# Patient Record
Sex: FEMALE | Race: WHITE | Employment: OTHER | URBAN - METROPOLITAN AREA
[De-identification: names, ages, dates, MRNs, and addresses within clinical notes are randomized per-mention and may not be internally consistent; named-entity substitution may affect disease eponyms.]

---

## 2017-04-11 ENCOUNTER — OFFICE VISIT (OUTPATIENT)
Dept: GASTROENTEROLOGY | Age: 67
End: 2017-04-11

## 2017-04-11 VITALS
HEART RATE: 89 BPM | DIASTOLIC BLOOD PRESSURE: 76 MMHG | BODY MASS INDEX: 25.79 KG/M2 | SYSTOLIC BLOOD PRESSURE: 140 MMHG | WEIGHT: 155 LBS

## 2017-04-11 DIAGNOSIS — Z79.899 ENCOUNTER FOR LONG-TERM (CURRENT) USE OF MEDICATIONS: ICD-10-CM

## 2017-04-11 DIAGNOSIS — Z80.0 FAMILY HISTORY OF COLON CANCER: Primary | ICD-10-CM

## 2017-04-11 PROCEDURE — 1123F ACP DISCUSS/DSCN MKR DOCD: CPT | Performed by: INTERNAL MEDICINE

## 2017-04-11 PROCEDURE — 1090F PRES/ABSN URINE INCON ASSESS: CPT | Performed by: INTERNAL MEDICINE

## 2017-04-11 PROCEDURE — 3017F COLORECTAL CA SCREEN DOC REV: CPT | Performed by: INTERNAL MEDICINE

## 2017-04-11 PROCEDURE — 1036F TOBACCO NON-USER: CPT | Performed by: INTERNAL MEDICINE

## 2017-04-11 PROCEDURE — G8399 PT W/DXA RESULTS DOCUMENT: HCPCS | Performed by: INTERNAL MEDICINE

## 2017-04-11 PROCEDURE — 99203 OFFICE O/P NEW LOW 30 MIN: CPT | Performed by: INTERNAL MEDICINE

## 2017-04-11 PROCEDURE — 4040F PNEUMOC VAC/ADMIN/RCVD: CPT | Performed by: INTERNAL MEDICINE

## 2017-04-11 PROCEDURE — G8420 CALC BMI NORM PARAMETERS: HCPCS | Performed by: INTERNAL MEDICINE

## 2017-04-11 PROCEDURE — 3014F SCREEN MAMMO DOC REV: CPT | Performed by: INTERNAL MEDICINE

## 2017-04-11 PROCEDURE — G8427 DOCREV CUR MEDS BY ELIG CLIN: HCPCS | Performed by: INTERNAL MEDICINE

## 2017-06-07 ENCOUNTER — HOSPITAL ENCOUNTER (OUTPATIENT)
Age: 67
Setting detail: OUTPATIENT SURGERY
Discharge: HOME | End: 2017-06-07
Attending: INTERNAL MEDICINE | Admitting: INTERNAL MEDICINE
Payer: MEDICARE

## 2017-06-07 ENCOUNTER — ANESTHESIA (OUTPATIENT)
Dept: ENDOSCOPY | Age: 67
End: 2017-06-07
Payer: MEDICARE

## 2017-06-07 ENCOUNTER — ANESTHESIA EVENT (OUTPATIENT)
Dept: ENDOSCOPY | Age: 67
End: 2017-06-07
Payer: MEDICARE

## 2017-06-07 VITALS
WEIGHT: 159 LBS | HEIGHT: 64 IN | BODY MASS INDEX: 27.14 KG/M2 | OXYGEN SATURATION: 98 % | SYSTOLIC BLOOD PRESSURE: 107 MMHG | HEART RATE: 74 BPM | DIASTOLIC BLOOD PRESSURE: 83 MMHG | TEMPERATURE: 97.9 F | RESPIRATION RATE: 18 BRPM

## 2017-06-07 VITALS
DIASTOLIC BLOOD PRESSURE: 53 MMHG | TEMPERATURE: 97.7 F | RESPIRATION RATE: 13 BRPM | SYSTOLIC BLOOD PRESSURE: 91 MMHG | OXYGEN SATURATION: 99 %

## 2017-06-07 PROCEDURE — 3700000001 HC ADD 15 MINUTES (ANESTHESIA): Performed by: INTERNAL MEDICINE

## 2017-06-07 PROCEDURE — 6360000002 HC RX W HCPCS: Performed by: NURSE ANESTHETIST, CERTIFIED REGISTERED

## 2017-06-07 PROCEDURE — 7100000010 HC PHASE II RECOVERY - FIRST 15 MIN: Performed by: INTERNAL MEDICINE

## 2017-06-07 PROCEDURE — 3700000000 HC ANESTHESIA ATTENDED CARE: Performed by: INTERNAL MEDICINE

## 2017-06-07 PROCEDURE — 7100000011 HC PHASE II RECOVERY - ADDTL 15 MIN: Performed by: INTERNAL MEDICINE

## 2017-06-07 PROCEDURE — 2580000003 HC RX 258: Performed by: INTERNAL MEDICINE

## 2017-06-07 PROCEDURE — 2500000003 HC RX 250 WO HCPCS: Performed by: NURSE ANESTHETIST, CERTIFIED REGISTERED

## 2017-06-07 PROCEDURE — 3609027000 HC COLONOSCOPY: Performed by: INTERNAL MEDICINE

## 2017-06-07 RX ORDER — PROPOFOL 10 MG/ML
INJECTION, EMULSION INTRAVENOUS PRN
Status: DISCONTINUED | OUTPATIENT
Start: 2017-06-07 | End: 2017-06-07 | Stop reason: SDUPTHER

## 2017-06-07 RX ORDER — LIDOCAINE HYDROCHLORIDE 10 MG/ML
1 INJECTION, SOLUTION EPIDURAL; INFILTRATION; INTRACAUDAL; PERINEURAL
Status: DISCONTINUED | OUTPATIENT
Start: 2017-06-07 | End: 2017-06-07 | Stop reason: HOSPADM

## 2017-06-07 RX ORDER — LIDOCAINE HYDROCHLORIDE 20 MG/ML
INJECTION, SOLUTION EPIDURAL; INFILTRATION; INTRACAUDAL; PERINEURAL PRN
Status: DISCONTINUED | OUTPATIENT
Start: 2017-06-07 | End: 2017-06-07 | Stop reason: SDUPTHER

## 2017-06-07 RX ORDER — SODIUM CHLORIDE 0.9 % (FLUSH) 0.9 %
10 SYRINGE (ML) INJECTION PRN
Status: DISCONTINUED | OUTPATIENT
Start: 2017-06-07 | End: 2017-06-07 | Stop reason: HOSPADM

## 2017-06-07 RX ORDER — ONDANSETRON 2 MG/ML
4 INJECTION INTRAMUSCULAR; INTRAVENOUS
Status: DISCONTINUED | OUTPATIENT
Start: 2017-06-07 | End: 2017-06-07 | Stop reason: HOSPADM

## 2017-06-07 RX ORDER — SODIUM CHLORIDE 9 MG/ML
INJECTION, SOLUTION INTRAVENOUS CONTINUOUS
Status: DISCONTINUED | OUTPATIENT
Start: 2017-06-07 | End: 2017-06-07 | Stop reason: HOSPADM

## 2017-06-07 RX ORDER — SODIUM CHLORIDE 0.9 % (FLUSH) 0.9 %
10 SYRINGE (ML) INJECTION EVERY 12 HOURS SCHEDULED
Status: DISCONTINUED | OUTPATIENT
Start: 2017-06-07 | End: 2017-06-07 | Stop reason: HOSPADM

## 2017-06-07 RX ADMIN — PROPOFOL 70 MG: 10 INJECTION, EMULSION INTRAVENOUS at 12:15

## 2017-06-07 RX ADMIN — PROPOFOL 100 MG: 10 INJECTION, EMULSION INTRAVENOUS at 12:10

## 2017-06-07 RX ADMIN — SODIUM CHLORIDE: 900 INJECTION, SOLUTION INTRAVENOUS at 12:15

## 2017-06-07 RX ADMIN — LIDOCAINE HYDROCHLORIDE 30 MG: 20 INJECTION, SOLUTION EPIDURAL; INFILTRATION; INTRACAUDAL; PERINEURAL at 12:08

## 2017-06-07 RX ADMIN — SODIUM CHLORIDE: 900 INJECTION, SOLUTION INTRAVENOUS at 11:44

## 2017-06-07 RX ADMIN — PROPOFOL 100 MG: 10 INJECTION, EMULSION INTRAVENOUS at 12:08

## 2023-10-03 ENCOUNTER — OFFICE VISIT (OUTPATIENT)
Dept: BEHAVIORAL HEALTH | Facility: CLINIC | Age: 73
End: 2023-10-03
Payer: MEDICARE

## 2023-10-03 VITALS
TEMPERATURE: 97.7 F | WEIGHT: 160.56 LBS | RESPIRATION RATE: 18 BRPM | SYSTOLIC BLOOD PRESSURE: 146 MMHG | DIASTOLIC BLOOD PRESSURE: 81 MMHG | BODY MASS INDEX: 27.41 KG/M2 | HEIGHT: 64 IN | HEART RATE: 68 BPM

## 2023-10-03 DIAGNOSIS — F41.9 ANXIETY: ICD-10-CM

## 2023-10-03 PROCEDURE — 99213 OFFICE O/P EST LOW 20 MIN: CPT | Performed by: PSYCHIATRY & NEUROLOGY

## 2023-10-03 RX ORDER — ALPRAZOLAM 0.25 MG/1
0.25 TABLET ORAL 4 TIMES DAILY PRN
COMMUNITY
End: 2023-10-03 | Stop reason: SDUPTHER

## 2023-10-03 RX ORDER — SERTRALINE HYDROCHLORIDE 50 MG/1
50 TABLET, FILM COATED ORAL DAILY
Qty: 30 TABLET | Refills: 11 | Status: SHIPPED | OUTPATIENT
Start: 2023-10-03 | End: 2024-10-02

## 2023-10-03 RX ORDER — ALPRAZOLAM 0.25 MG/1
0.25 TABLET ORAL 4 TIMES DAILY PRN
Qty: 120 TABLET | Refills: 5 | Status: SHIPPED | OUTPATIENT
Start: 2023-10-03 | End: 2024-05-14 | Stop reason: SDUPTHER

## 2023-10-03 NOTE — PROGRESS NOTES
Patient Discussion/Summary     Doing well on current psychiatric medications with alprazolam 0.25 mg. 4 times only without difficulty for significant periods of weeks or months. Continue sertraline 50 mg daily and alprazolam at the current dose.  RTC in 5 months earlier as needed..      Chief Complaint     Face - To - Face Visit.   Chronic condition stable at goal.  20'.      History of Present Illness  This note was dictated using dragon speak there may be errors in transcription of the dictation.  20'     Here on a regular scheduled follow-up visit for this 72-year-old white  woman with panic disorder and agoraphobia who was initially seen in the anxiety clinic in April 1999 and successfully treated with sertraline 50 mg daily and Xanax 0.25 mg 4 times a day with maintained remission to the present. An earlier switch to long acting Xanax was unsuccessful and a trial of Wellbutrin for depressed mood when she lost her father more than 10 years ago had to be stopped due to side effects of palpitation and increased anxiety. 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 She has been in good general health in the interim(followed at [facility]) and with essentially unchanged presentation and condition today: alert, oriented, cooperative, pleasant, appropriate, in no acute distress, agitation or confusion, no evidence of psychosis major mood disturbance, suicidal or homicidal ideations with intent or plan. No evidence of pathological grief ,affect full range, well-modulated, future oriented, spending time with her  who is retired and the children and grandchildren as usual. Denied alcohol and substance abuse, in particular benzodiazepines, and OARRS did not reveal any issues of concern. Today we discussed continuing   alprazolam 0.25 mg 4 times a day on a regular basis since it appeared that this is the dose she has taken regularly over several months.   Impression: panic disorder in remission.  Plan: Continue maintenance pharmacotherapy sertraline 50 mg daily and alprazolam 0.25 mg 4 times a day. RTC in 5 months, earlier when necessary..      I have personally reviewed the OARRS report for NANDO REYESCANDIDO. I have considered the risks of abuse, dependence, addiction and diversion.   I have the following concerns: no.

## 2024-04-02 ENCOUNTER — APPOINTMENT (OUTPATIENT)
Dept: BEHAVIORAL HEALTH | Facility: CLINIC | Age: 74
End: 2024-04-02
Payer: MEDICARE

## 2024-05-07 ENCOUNTER — APPOINTMENT (OUTPATIENT)
Dept: BEHAVIORAL HEALTH | Facility: CLINIC | Age: 74
End: 2024-05-07
Payer: MEDICARE

## 2024-05-14 ENCOUNTER — OFFICE VISIT (OUTPATIENT)
Dept: BEHAVIORAL HEALTH | Facility: CLINIC | Age: 74
End: 2024-05-14
Payer: MEDICARE

## 2024-05-14 VITALS
BODY MASS INDEX: 27.88 KG/M2 | WEIGHT: 163.3 LBS | SYSTOLIC BLOOD PRESSURE: 140 MMHG | RESPIRATION RATE: 16 BRPM | HEIGHT: 64 IN | HEART RATE: 73 BPM | DIASTOLIC BLOOD PRESSURE: 74 MMHG | TEMPERATURE: 98.1 F

## 2024-05-14 DIAGNOSIS — F41.9 ANXIETY: ICD-10-CM

## 2024-05-14 PROCEDURE — 99214 OFFICE O/P EST MOD 30 MIN: CPT | Performed by: PSYCHIATRY & NEUROLOGY

## 2024-05-14 RX ORDER — ALPRAZOLAM 0.25 MG/1
0.25 TABLET ORAL 3 TIMES DAILY PRN
Qty: 90 TABLET | Refills: 5 | Status: SHIPPED | OUTPATIENT
Start: 2024-05-14

## 2024-05-14 NOTE — PROGRESS NOTES
Patient Discussion/Summary     Doing well on current psychiatric medications  on sertraline 50 mg daily and alprazolam 0.25 mg 3-4 times daily in addition to gabapentin 600mg for sciatica in recent months.Continue on alprazolam 3 times daily only until RTC in 5 months earlier as needed..      Chief Complaint     Face - To - Face Visit.   Chronic condition stable at goal.  30'.      History of Present Illness  This note was dictated using dragon speak there may be errors in transcription of the dictation.  30'     Here on a regular scheduled follow-up visit for this 73-year-old white  woman with panic disorder and agoraphobia who was initially seen in the anxiety clinic in April 1999 and successfully treated with sertraline 50 mg daily and Xanax 0.25 mg 4 times a day with maintained remission to the present. An earlier switch to long acting Xanax was unsuccessful and a trial of Wellbutrin for depressed mood when she lost her father more than 10 years ago had to be stopped due to side effects of palpitation and increased anxiety. Today she presents with no specific complaints of panic/avoidances, depression since her last visit of 10/3/23 and reports taking Xanax 3-4 times a day without untoward reactions after starting on gabapentin currently on 600mg daily for sciatica in recent months with very good effect. Otherwise she has continued to be  in good general health in the interim(followed at [facility]) and with essentially unchanged presentation and condition today: alert, oriented, cooperative, pleasant, appropriate, in no acute distress, agitation or confusion, no evidence of psychosis major mood disturbance, suicidal or homicidal ideations with intent or plan. No evidence of change in MSE or pathological grief ,affect full range, well-modulated, future oriented, spending time with her  who is retired and the children and grandchildren as usual. Denied alcohol and substance abuse, in particular  benzodiazepines, and OARRS did not reveal any issues of concern. Today we discussed continuing  alprazolam on the slightly decreased dose of 0.25 mg 3 times a day on a regular basis since she no longer has the stress of taking care of her mother's estate or other situational concerns.   Impression: panic disorder in remission.  Plan: Continue maintenance pharmacotherapy sertraline 50 mg daily and alprazolam 0.25 mg 3 times a day. RTC in 5 months, earlier when necessary..      I have personally reviewed the OARRS report for NANDO FLORES. I have considered the risks of abuse, dependence, addiction and diversion.   I have the following concerns: no.

## 2024-06-04 ENCOUNTER — PATIENT MESSAGE (OUTPATIENT)
Dept: BEHAVIORAL HEALTH | Facility: CLINIC | Age: 74
End: 2024-06-04
Payer: MEDICARE

## 2024-10-05 ENCOUNTER — TELEPHONE (OUTPATIENT)
Dept: PSYCHIATRY | Facility: HOSPITAL | Age: 74
End: 2024-10-05
Payer: MEDICARE

## 2024-10-05 NOTE — PROGRESS NOTES
Patient called the answering service; returned page. Patient concerned that she is running out of her sertraline 50 mg that she takes daily. Denies symptomatic/emotional/mental concerns at this time. Denies SI/HI/AVH. Patient informed that if these thoughts ever exist, to go to the emergency department. Patient expressed understanding.    Patient follows with Dr. Romo. Diagnosed with panic disorder and takes sertraline 50 mg daily.    I decided to not refill the patient's medication due to the lack of an emergent withdrawal syndrome. Patient confirms that she took a dose last night and would be covered for today 10/5, and would only not have medication for Driss 10/6. I encouraged the patient to call her psychiatrist ASAP Monday morning to rectify the prescription issue. In the meantime, I encouraged patient to use coping skills and immediately support system. If mental state worsens, or if patient endorses SI/HI/AVH, to go immediately to the emergency department.      10/05/24 at 5:17 PM - Brandan Deal MD  Adult Psychiatry, PGY-1

## 2024-10-07 DIAGNOSIS — F41.9 ANXIETY: ICD-10-CM

## 2024-10-07 RX ORDER — SERTRALINE HYDROCHLORIDE 50 MG/1
50 TABLET, FILM COATED ORAL DAILY
Qty: 30 TABLET | Refills: 11 | Status: SHIPPED | OUTPATIENT
Start: 2024-10-07 | End: 2025-10-07

## 2024-11-12 ENCOUNTER — APPOINTMENT (OUTPATIENT)
Dept: BEHAVIORAL HEALTH | Facility: CLINIC | Age: 74
End: 2024-11-12
Payer: MEDICARE

## 2024-11-12 DIAGNOSIS — F41.9 ANXIETY: ICD-10-CM

## 2024-11-12 PROCEDURE — 99214 OFFICE O/P EST MOD 30 MIN: CPT | Performed by: PSYCHIATRY & NEUROLOGY

## 2024-11-12 RX ORDER — SERTRALINE HYDROCHLORIDE 50 MG/1
50 TABLET, FILM COATED ORAL DAILY
Qty: 30 TABLET | Refills: 11 | Status: SHIPPED | OUTPATIENT
Start: 2024-11-12 | End: 2025-11-12

## 2024-11-12 RX ORDER — ALPRAZOLAM 0.25 MG/1
0.25 TABLET ORAL 4 TIMES DAILY
Qty: 120 TABLET | Refills: 5 | Status: SHIPPED | OUTPATIENT
Start: 2024-11-12

## 2024-11-12 NOTE — PROGRESS NOTES
Patient Discussion/Summary     Doing well on current sertraline 50 mg daily and alprazolam readjusted to 0.25 mg 4 times daily .Continue on the same regimen until RTC in 6 months, earlier as needed..      Chief Complaint   Face to Face  Chronic condition stable at goal.  30'.      History of Present Illness  This note was dictated using dragon speak there may be errors in transcription of the dictation.  30'     Here on a regular scheduled follow-up visit for this 74-year-old white  woman with panic disorder and agoraphobia who was initially seen in the anxiety clinic in April 1999 and successfully treated with sertraline 50 mg daily and Xanax 0.25 mg 4 times a day with maintained remission to the present.An earlier switch to long acting Xanax was unsuccessful and a trial of Wellbutrin for depressed mood when she lost her father more than 10 years ago had to be stopped due to side effects of palpitation and increased anxiety. Today she presents with no specific complaints of panic/avoidances, depression since her last visit on 5/14/24 and reports taking the readjusted dose of Xanax 4 times a day without untoward reactions after stopping gabapentin for sciatica with very good effect.  Her main complaint was that of hip and back pain for which he has started taking Tylenol regularly otherwise she has continued to be  in good general health in the interim(followed at [facility]) and with essentially unchanged presentation and condition today: alert, oriented, cooperative, pleasant, appropriate, in no acute distress, agitation or confusion, no evidence of psychosis major mood disturbance, suicidal or homicidal ideations with intent or plan. No evidence of change in MSE or pathological grief ,affect full range, well-modulated, future oriented, spending time with her  who is retired and the children and grandchildren as usual.  The only situational distressing events in the interim concern her and her 's  relationship with her middle daughter who apparently has developed a pattern of over drinking at which times, and this has been exacerbated tremendously during the presidential campaign culminating with the election results, she gets into ferocious arguments about the conservative views of her parents. We discussed at some length her and her 's distress during these encounters but also and mainly her concern about her drinking.  At the end of the session she was informed  about 's drug and alcohol program and given their contact number as a potential treatment resource.  She denied anxiety panic or mood difficulties, denied alcohol and substance abuse, in particular benzodiazepines, and OARRS did not reveal any issues of concern.   Impression: panic disorder in remission.  Plan: Continue maintenance pharmacotherapy sertraline 50 mg daily and alprazolam 0.25 mg 4 times a day. RTC in 6 months, earlier when necessary..

## 2025-05-13 ENCOUNTER — APPOINTMENT (OUTPATIENT)
Dept: BEHAVIORAL HEALTH | Facility: CLINIC | Age: 75
End: 2025-05-13
Payer: MEDICARE

## 2025-05-27 ENCOUNTER — APPOINTMENT (OUTPATIENT)
Dept: BEHAVIORAL HEALTH | Facility: CLINIC | Age: 75
End: 2025-05-27
Payer: MEDICARE

## 2025-05-27 DIAGNOSIS — F41.9 ANXIETY: ICD-10-CM

## 2025-05-27 PROCEDURE — 99214 OFFICE O/P EST MOD 30 MIN: CPT | Performed by: PSYCHIATRY & NEUROLOGY

## 2025-05-27 RX ORDER — SERTRALINE HYDROCHLORIDE 50 MG/1
50 TABLET, FILM COATED ORAL DAILY
Qty: 90 TABLET | Refills: 3 | Status: SHIPPED | OUTPATIENT
Start: 2025-05-27 | End: 2026-05-27

## 2025-05-27 RX ORDER — ALPRAZOLAM 0.25 MG/1
0.25 TABLET ORAL 4 TIMES DAILY
Qty: 120 TABLET | Refills: 5 | Status: SHIPPED | OUTPATIENT
Start: 2025-05-27

## 2025-05-27 NOTE — PROGRESS NOTES
Patient Discussion/Summary     Doing well on current sertraline 50 mg daily and alprazolam readjusted to 0.25 mg 4 times daily .Continue on the same regimen until RTC in 6 months, earlier as needed..      Chief Complaint   An interactive audio and video telecommunication system which permits real time communications between the patient (at the originating site)   and provider (at the distant site) was utilized to provide this telehealth service.   Verbal consent was requested and obtained from Haydee Valadez on this date 5/27/2025 for a telehealth visit.  Chronic condition stable at goal.  30'.      History of Present Illness  This note was dictated using dragon speak there may be errors in transcription of the dictation.  30'     Here on a regular scheduled follow-up visit for this 74-year-old white  woman with panic disorder and agoraphobia who was initially seen in the anxiety clinic in April 1999 and successfully treated with sertraline 50 mg daily and Xanax 0.25 mg 4 times a day with maintained remission to the present.An earlier switch to long acting Xanax was unsuccessful and a trial of Wellbutrin for depressed mood when she lost her father more than 10 years ago had to be stopped due to side effects of palpitation and increased anxiety. Today she presents with no specific complaints of panic/avoidances, depression since her last visit on 11/12/24 and reports taking the readjusted dose of Xanax 4 times a day without untoward reactions with very good effect.  Hip and back pain continues to be well managed  otherwise she has continued to be  in good general health in the interim(followed at [facility]) with labs renewed with normal LFTs but no TFTs and essentially unchanged presentation and condition. She reported having had a  very good trip to [place] with her . Today she was alert, oriented, cooperative, pleasant, appropriate, in no acute distress, agitation or confusion, no evidence of  psychosis major mood disturbance, suicidal or homicidal ideations with intent or plan. No evidence of change in MSE or pathological grief ,affect full range, well-modulated, future oriented, spending time with her  who is retired and the children and grandchildren as usual.  She denied anxiety panic or mood difficulties, denied alcohol and substance abuse, in particular benzodiazepines, and OARRS did not reveal any issues of concern. The problem with her daughter did not come up.  Impression: panic disorder in remission.  Plan: Continue maintenance pharmacotherapy sertraline 50 mg daily and alprazolam 0.25 mg 4 times a day. RTC in 6 months, earlier when necessary..